# Patient Record
Sex: MALE | Race: BLACK OR AFRICAN AMERICAN | Employment: STUDENT | URBAN - METROPOLITAN AREA
[De-identification: names, ages, dates, MRNs, and addresses within clinical notes are randomized per-mention and may not be internally consistent; named-entity substitution may affect disease eponyms.]

---

## 2024-03-05 ENCOUNTER — HOSPITAL ENCOUNTER (EMERGENCY)
Facility: HOSPITAL | Age: 29
Discharge: HOME | End: 2024-03-05
Attending: EMERGENCY MEDICINE
Payer: MEDICAID

## 2024-03-05 VITALS
RESPIRATION RATE: 18 BRPM | BODY MASS INDEX: 25.2 KG/M2 | SYSTOLIC BLOOD PRESSURE: 140 MMHG | OXYGEN SATURATION: 100 % | TEMPERATURE: 98 F | DIASTOLIC BLOOD PRESSURE: 84 MMHG | HEIGHT: 71 IN | WEIGHT: 180 LBS | HEART RATE: 62 BPM

## 2024-03-05 DIAGNOSIS — J01.80 ACUTE NON-RECURRENT SINUSITIS OF OTHER SINUS: Primary | ICD-10-CM

## 2024-03-05 PROCEDURE — 99283 EMERGENCY DEPT VISIT LOW MDM: CPT

## 2024-03-05 RX ORDER — DOXYCYCLINE HYCLATE 100 MG/1
100 CAPSULE ORAL 2 TIMES DAILY
Qty: 14 CAPSULE | Refills: 0 | Status: SHIPPED | OUTPATIENT
Start: 2024-03-05 | End: 2024-03-12

## 2024-03-05 RX ORDER — KETOROLAC TROMETHAMINE 10 MG/1
10 TABLET, FILM COATED ORAL EVERY 6 HOURS PRN
Qty: 20 TABLET | Refills: 0 | Status: SHIPPED | OUTPATIENT
Start: 2024-03-05 | End: 2024-03-12

## 2024-03-05 RX ORDER — FLUTICASONE PROPIONATE 50 MCG
2 SPRAY, SUSPENSION (ML) NASAL DAILY
Qty: 16 G | Refills: 0 | Status: SHIPPED | OUTPATIENT
Start: 2024-03-05 | End: 2024-04-04

## 2024-03-05 RX ORDER — CETIRIZINE HYDROCHLORIDE 10 MG/1
10 TABLET ORAL DAILY
Qty: 30 TABLET | Refills: 0 | Status: SHIPPED | OUTPATIENT
Start: 2024-03-05 | End: 2024-04-04

## 2024-03-05 NOTE — ED INITIAL ASSESSMENT (HPI)
Patient ambulatory to ED with complaint of Left ear pain for a few days. Was seen at  and given abx symptoms not improving      Patient is AXOX4.

## 2024-03-05 NOTE — ED PROVIDER NOTES
Patient Seen in: [facility] Emergency Department    History     Chief Complaint   Patient presents with    Ear Pain       HPI    History is provided by patient/independent historian: Patient  28 year old male with significant past medical history here with complaints of ongoing left-sided ear pain for the last few days.  He was seen at an outside of state immediate care and given antibiotics without improvement of his symptoms.  He has been taking intermittent ibuprofen.  He does report some head pressure as well as congestion.  No sore throat, no abdominal pain, nausea or vomiting.  No drainage.  No decreased hearing.    History reviewed. History reviewed. No pertinent past medical history.      History reviewed. No past surgical history on file.      Home Medications reviewed :  (Not in a hospital admission)        History reviewed.        ROS  Review of Systems   HENT:  Positive for congestion and ear pain.    Respiratory:  Negative for shortness of breath.    Cardiovascular:  Negative for chest pain.   Neurological:  Positive for headaches.   All other systems reviewed and are negative.     All other pertinent organ systems are reviewed and are negative.      Physical Exam     ED Triage Vitals [03/05/24 0852]   /84   Pulse 62   Resp 18   Temp 98.1 °F (36.7 °C)   Temp src    SpO2 100 %   O2 Device None (Room air)     Vital signs reviewed.      Physical Exam  Vitals and nursing note reviewed.   HENT:      Right Ear: Tympanic membrane normal.      Left Ear: Tympanic membrane normal.      Nose:      Comments: Sinus tenderness  Cardiovascular:      Pulses: Normal pulses.   Pulmonary:      Effort: No respiratory distress.   Abdominal:      General: There is no distension.   Neurological:      Mental Status: He is alert.         ED Course       Labs:   Labs Reviewed - No data to display      My EKG Interpretation:   As reviewed and Interpreted by me      Imaging Results Available and Reviewed while in  ED:   No results found.      Decision rules/scores evaluated: none      Diagnostic labs/tests considered but not ordered: CBC, BMP, type and screen, CT brain    ED Medications Administered: Medications - No data to display             MDM       Medical Decision Making      Differential Diagnosis: After obtaining the patient's history, performing the physical exam and reviewing the diagnostics, multiple initial diagnoses were considered based on the presenting problem including sinusitis, otitis media, otitis externa, foreign body, ruptured TM    External document review: I personally reviewed available external medical records for any recent pertinent discharge summaries, testing, and procedures - the findings are as follows: none available    Complicating Factors: The patient already  has no past medical history on file. to contribute to the complexity of this ED evaluation.    Procedures performed: none    Discussed management with physician/appropriate source: none    Considered admission/deescalation of care for: none    Social determinants of health affecting patient care: none    Prescription medications considered: discussed continuing current medication regimen    The patient requires continuous monitoring for: ear pain    Shared decision making: discussed possible admission      Disposition and Plan     Clinical Impression:  1. Acute non-recurrent sinusitis of other sinus        Disposition:  Discharge    Follow-up:  Sky Modi MD  [address] [place] [id number]  [phone]    Follow up        Medications Prescribed:  Current Discharge Medication List        START taking these medications    Details   doxycycline 100 MG Oral Cap Take 1 capsule (100 mg total) by mouth 2 (two) times daily for 7 days.  Qty: 14 capsule, Refills: 0      cetirizine 10 MG Oral Tab Take 1 tablet (10 mg total) by mouth daily.  Qty: 30 tablet, Refills: 0      fluticasone propionate 50 MCG/ACT Nasal Suspension 2 sprays by  Nasal route daily.  Qty: 16 g, Refills: 0      Ketorolac Tromethamine 10 MG Oral Tab Take 1 tablet (10 mg total) by mouth every 6 (six) hours as needed.  Qty: 20 tablet, Refills: 0

## 2024-06-13 ENCOUNTER — HOSPITAL ENCOUNTER (EMERGENCY)
Facility: HOSPITAL | Age: 29
Discharge: HOME | End: 2024-06-14
Attending: EMERGENCY MEDICINE

## 2024-06-13 ENCOUNTER — APPOINTMENT (OUTPATIENT)
Dept: ULTRASOUND IMAGING | Facility: HOSPITAL | Age: 29
End: 2024-06-13
Attending: EMERGENCY MEDICINE

## 2024-06-13 DIAGNOSIS — R10.10 UPPER ABDOMINAL PAIN: Primary | ICD-10-CM

## 2024-06-13 LAB
ALBUMIN SERPL-MCNC: 4.7 G/DL (ref 3.2–4.8)
ALP LIVER SERPL-CCNC: 71 U/L
ALT SERPL-CCNC: 62 U/L
ANION GAP SERPL CALC-SCNC: 6 MMOL/L (ref 0–18)
AST SERPL-CCNC: 24 U/L (ref ?–34)
BASOPHILS # BLD AUTO: 0.03 X10(3) UL (ref 0–0.2)
BASOPHILS NFR BLD AUTO: 0.6 %
BILIRUB DIRECT SERPL-MCNC: 0.1 MG/DL (ref ?–0.3)
BILIRUB SERPL-MCNC: 0.4 MG/DL (ref 0.3–1.2)
BUN BLD-MCNC: 9 MG/DL (ref 9–23)
BUN/CREAT SERPL: 7.9 (ref 10–20)
CALCIUM BLD-MCNC: 9.9 MG/DL (ref 8.7–10.4)
CHLORIDE SERPL-SCNC: 107 MMOL/L (ref 98–112)
CO2 SERPL-SCNC: 28 MMOL/L (ref 21–32)
CREAT BLD-MCNC: 1.14 MG/DL
DEPRECATED RDW RBC AUTO: 40 FL (ref 35.1–46.3)
EGFRCR SERPLBLD CKD-EPI 2021: 90 ML/MIN/1.73M2 (ref 60–?)
EOSINOPHIL # BLD AUTO: 0.2 X10(3) UL (ref 0–0.7)
EOSINOPHIL NFR BLD AUTO: 4.2 %
ERYTHROCYTE [DISTWIDTH] IN BLOOD BY AUTOMATED COUNT: 12.3 % (ref 11–15)
GLUCOSE BLD-MCNC: 112 MG/DL (ref 70–99)
HCT VFR BLD AUTO: 48.7 %
HGB BLD-MCNC: 17.3 G/DL
IMM GRANULOCYTES # BLD AUTO: 0.02 X10(3) UL (ref 0–1)
IMM GRANULOCYTES NFR BLD: 0.4 %
LIPASE SERPL-CCNC: 32 U/L (ref 13–75)
LYMPHOCYTES # BLD AUTO: 1.72 X10(3) UL (ref 1–4)
LYMPHOCYTES NFR BLD AUTO: 36.1 %
MCH RBC QN AUTO: 31.4 PG (ref 26–34)
MCHC RBC AUTO-ENTMCNC: 35.5 G/DL (ref 31–37)
MCV RBC AUTO: 88.4 FL
MONOCYTES # BLD AUTO: 0.45 X10(3) UL (ref 0.1–1)
MONOCYTES NFR BLD AUTO: 9.4 %
NEUTROPHILS # BLD AUTO: 2.35 X10 (3) UL (ref 1.5–7.7)
NEUTROPHILS # BLD AUTO: 2.35 X10(3) UL (ref 1.5–7.7)
NEUTROPHILS NFR BLD AUTO: 49.3 %
OSMOLALITY SERPL CALC.SUM OF ELEC: 291 MOSM/KG (ref 275–295)
PLATELET # BLD AUTO: 231 10(3)UL (ref 150–450)
POTASSIUM SERPL-SCNC: 3.8 MMOL/L (ref 3.5–5.1)
PROT SERPL-MCNC: 7.7 G/DL (ref 5.7–8.2)
RBC # BLD AUTO: 5.51 X10(6)UL
SODIUM SERPL-SCNC: 141 MMOL/L (ref 136–145)
WBC # BLD AUTO: 4.8 X10(3) UL (ref 4–11)

## 2024-06-13 PROCEDURE — 36415 COLL VENOUS BLD VENIPUNCTURE: CPT

## 2024-06-13 PROCEDURE — 99285 EMERGENCY DEPT VISIT HI MDM: CPT

## 2024-06-13 PROCEDURE — 80048 BASIC METABOLIC PNL TOTAL CA: CPT | Performed by: EMERGENCY MEDICINE

## 2024-06-13 PROCEDURE — 85025 COMPLETE CBC W/AUTO DIFF WBC: CPT | Performed by: EMERGENCY MEDICINE

## 2024-06-13 PROCEDURE — 99284 EMERGENCY DEPT VISIT MOD MDM: CPT

## 2024-06-13 PROCEDURE — 80076 HEPATIC FUNCTION PANEL: CPT | Performed by: EMERGENCY MEDICINE

## 2024-06-13 PROCEDURE — 83690 ASSAY OF LIPASE: CPT | Performed by: EMERGENCY MEDICINE

## 2024-06-13 PROCEDURE — 76705 ECHO EXAM OF ABDOMEN: CPT | Performed by: EMERGENCY MEDICINE

## 2024-06-13 RX ORDER — ACETAMINOPHEN 500 MG
1000 TABLET ORAL ONCE
Status: COMPLETED | OUTPATIENT
Start: 2024-06-13 | End: 2024-06-13

## 2024-06-14 VITALS
DIASTOLIC BLOOD PRESSURE: 86 MMHG | HEIGHT: 71 IN | HEART RATE: 54 BPM | BODY MASS INDEX: 27.44 KG/M2 | TEMPERATURE: 98 F | WEIGHT: 196 LBS | OXYGEN SATURATION: 97 % | RESPIRATION RATE: 16 BRPM | SYSTOLIC BLOOD PRESSURE: 117 MMHG

## 2024-06-14 RX ORDER — NICOTINE POLACRILEX 4 MG/1
20 GUM, CHEWING ORAL DAILY
Qty: 30 TABLET | Refills: 0 | Status: SHIPPED | OUTPATIENT
Start: 2024-06-14 | End: 2024-07-14

## 2024-06-14 NOTE — DISCHARGE INSTRUCTIONS
Take the antiacid as prescribed.  You may also use Mylanta or Tums or some other over-the-counter antiacid.  Return to the ER for changes or worsening.  Follow-up with your primary as well as the gastroenterologist for further evaluation.  Additional testing may be recommended.  Read all instructions for further recommendations.

## 2024-06-14 NOTE — ED INITIAL ASSESSMENT (HPI)
Pt here with RUQ abdominal pain starting 2 days ago, pain has gotten slightly worse today. Denies fevers/Nausea/Vomiting.

## 2024-06-14 NOTE — ED PROVIDER NOTES
Patient Seen in: [facility] Emergency Department      History   No chief complaint on file.    Stated Complaint: Abdominal pain    Subjective:   28-year-old male with no medical problems, no surgical history, no alcohol abuse here with right upper quadrant abdominal pain for 2 days.  Its achy and sharp.  It does not radiate.  Does not think it is related to food.  No nausea vomiting or fevers.  No diarrhea.  No recent trauma or travel or antibiotics.  Denies abuse of NSAIDs.  No cough or congestion.  No rash or swelling or focal complaint.            Objective:   History reviewed. No pertinent past medical history.           History reviewed. No pertinent surgical history.             No pertinent social history.            Review of Systems    Positive for stated complaint: Abdominal pain  Other systems are as noted in HPI.  Constitutional and vital signs reviewed.      All other systems reviewed and negative except as noted above.    Physical Exam     ED Triage Vitals [06/13/24 2147]   /73   Pulse 78   Resp 18   Temp 98.4 °F (36.9 °C)   Temp src Oral   SpO2 97 %   O2 Device None (Room air)       Current Vitals:   Vital Signs  BP: 117/86  Pulse: 54  Resp: 16  Temp: 98.4 °F (36.9 °C)  Temp src: Oral  MAP (mmHg): 91    Oxygen Therapy  SpO2: 97 %  O2 Device: None (Room air)            Physical Exam  HENT:      Head: Normocephalic.      Right Ear: External ear normal.      Left Ear: External ear normal.      Nose: Nose normal.      Mouth/Throat:      Mouth: Mucous membranes are moist.   Eyes:      Extraocular Movements: Extraocular movements intact.   Cardiovascular:      Rate and Rhythm: Normal rate.      Pulses: Normal pulses.      Heart sounds: Normal heart sounds.   Abdominal:      Palpations: Abdomen is soft.      Comments: Mild tenderness right upper quadrant.  No rebound or guarding throughout the abdomen.  No CVA tenderness   Musculoskeletal:         General: Normal range of motion.      Cervical  back: Normal range of motion.   Skin:     General: Skin is warm.      Capillary Refill: Capillary refill takes less than 2 seconds.   Neurological:      Mental Status: He is alert.      Sensory: No sensory deficit.      Motor: No weakness.               ED Course     Labs Reviewed   HEPATIC FUNCTION PANEL (7) - Abnormal; Notable for the following components:       Result Value    ALT 62 (*)     All other components within normal limits   BASIC METABOLIC PANEL (8) - Abnormal; Notable for the following components:    Glucose 112 (*)     BUN/CREA Ratio 7.9 (*)     All other components within normal limits   LIPASE - Normal   CBC WITH DIFFERENTIAL WITH PLATELET    Narrative:     The following orders were created for panel order CBC With Differential With Platelet.  Procedure                               Abnormality         Status                     ---------                               -----------         ------                     CBC W/ DIFFERENTIAL[[id number]]                              Final result                 Please view results for these tests on the individual orders.   CBC W/ DIFFERENTIAL          ED Course as of 06/14/24 0004  ------------------------------------------------------------  Time: 06/13 235  Comment: Labs independently interpreted by me.  CBC normal, CMP shows a ALT of 62 which is slightly abnormal but no other abnormalities.  Lipase normal  ------------------------------------------------------------  Time: 06/13 2357  Comment: Gallbladder ultrasound independently interpreted by me as no acute findings it is unremarkable.  ------------------------------------------------------------  Time: 06/14 0001  Comment: Patient feeling better after Tylenol.  Abdomen soft.  Comfortable going home.  Unclear what the ALT elevation is from.  I am recommending he see gastroenterology and I will start him on omeprazole.  He voiced understanding of the instructions and will return for changes worsening  over the weekend.  Good anticipatory guidance was given.  I suggested doing some Tums as well.  At this time does not seem to be biliary colic.  Does not pancreatitis.  He also understands that additional imaging may be indicated in the future but none necessary tonight.  I did not feel CAT scan was indicated tonight as this could be unnecessary radiation.              MDM        US ABDOMEN LIMITED    IMPRESSION:  Unremarkable abdominal ultrasound.  Normal gallbladder.  No wall thickening or stones.  Negative Solomon sign.    CBD 2.3 mm nondilated.    Liver unremarkable. Right kidney unremarkable.  Pancreas not well seen.    Report faxed/transmitted at 12:40 AM EST. To discuss the case please call [phone]. If you can't reach me at this number, do not leave a voicemail.  Please call [phone] ext [phone] and ask for the next available Radiologist.                                   Medical Decision Making  28-year-old male with no significant history here with epigastric and right upper abdominal pain without radiation.  Differential vast but could include gastritis, ulcer, colitis, biliary colic, pancreatitis, renal colic, pyelonephritis and many other possibilities.  Will do labs and ultrasound of the gallbladder.  Does not seem to be in that much pain and was not significantly tender so could follow-up with GI if this is all normal as well as primary care.  Tylenol ordered for pain.    Amount and/or Complexity of Data Reviewed  External Data Reviewed: notes.     Details: Last 2 ER visits were reviewed [place] was out of state  Labs: ordered. Decision-making details documented in ED Course.  Radiology: ordered and independent interpretation performed.  Discussion of management or test interpretation with external provider(s): Please see ED course    Risk  Prescription drug management.        Disposition and Plan     Clinical Impression:  1. Upper abdominal pain         Disposition:  Discharge  6/14/2024 12:03  am    Follow-up:  Luis Angel Fowler MD  [address]  [facility] [id number] [phone]    Follow up      Olivia Bowser MD  [address] [facility] [address]  [facility] [address] [phone]    Follow up            Medications Prescribed:  Current Discharge Medication List        START taking these medications    Details   Omeprazole 20 MG Oral Tab EC Take 20 mg by mouth daily.  Qty: 30 tablet, Refills: 0